# Patient Record
Sex: FEMALE | Race: BLACK OR AFRICAN AMERICAN | Employment: OTHER | URBAN - METROPOLITAN AREA
[De-identification: names, ages, dates, MRNs, and addresses within clinical notes are randomized per-mention and may not be internally consistent; named-entity substitution may affect disease eponyms.]

---

## 2018-06-13 ENCOUNTER — HOSPITAL ENCOUNTER (OUTPATIENT)
Dept: MRI IMAGING | Age: 83
Discharge: HOME | End: 2018-06-13
Attending: FAMILY MEDICINE
Payer: MEDICARE

## 2018-06-13 VITALS — WEIGHT: 96 LBS

## 2018-06-13 DIAGNOSIS — L89.44: ICD-10-CM

## 2018-06-13 PROCEDURE — 72197 MRI PELVIS W/O & W/DYE: CPT

## 2018-06-13 PROCEDURE — A9575 INJ GADOTERATE MEGLUMI 0.1ML: HCPCS | Performed by: RADIOLOGY

## 2018-06-13 PROCEDURE — 74011250636 HC RX REV CODE- 250/636: Performed by: RADIOLOGY

## 2018-06-13 RX ORDER — GADOTERATE MEGLUMINE 376.9 MG/ML
8 INJECTION INTRAVENOUS
Status: COMPLETED | OUTPATIENT
Start: 2018-06-13 | End: 2018-06-13

## 2018-06-13 RX ADMIN — GADOTERATE MEGLUMINE 9 ML: 376.9 INJECTION INTRAVENOUS at 16:38

## 2018-06-15 ENCOUNTER — HOSPITAL ENCOUNTER (OUTPATIENT)
Dept: GENERAL RADIOLOGY | Age: 83
Discharge: HOME | End: 2018-06-15
Payer: MEDICARE

## 2018-06-15 DIAGNOSIS — R13.12 OROPHARYNGEAL DYSPHAGIA: ICD-10-CM

## 2018-06-15 DIAGNOSIS — R13.10 DYSPHAGIA: ICD-10-CM

## 2018-06-15 PROCEDURE — 74230 X-RAY XM SWLNG FUNCJ C+: CPT

## 2018-06-15 PROCEDURE — 92610 EVALUATE SWALLOWING FUNCTION: CPT

## 2018-06-15 PROCEDURE — G8996 SWALLOW CURRENT STATUS: HCPCS

## 2018-06-15 PROCEDURE — G8998 SWALLOW D/C STATUS: HCPCS

## 2018-06-15 PROCEDURE — 92611 MOTION FLUOROSCOPY/SWALLOW: CPT

## 2018-06-15 PROCEDURE — G8997 SWALLOW GOAL STATUS: HCPCS

## 2018-06-15 NOTE — PROGRESS NOTES
[facility]  [address]    Speech Pathology Modified barium swallow Study with cms g codes  Patient: Janis Jiménez (41 y.o. female)  Date: 6/15/2018  Referring Provider:  Jered Jacobs:   Patient's male family member brought her to [facility]. 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 Another large CVA in 2014. He reports that she can talk, but she was nonverbal in this MBS. He reports that she is on purees and thin liquids in LTC at [facility] and RNs and SLP state that she chokes on thins. He thinks that is from a cold. He wants her to be on regular foods b/c Hemanth Payan is starving and doesn't like purees\". He reports that  She has lost 10 lbs in the last 6 months that started with  UTI. He reported that when he takes her home, he fed her chopped chicken, green beans and thin liquids and she did not choke. He feeds her lunch and dinner at the SNF everyday. He reports that he is talking to MD about signing a waiver to feed her more solid foods for comfort. OBJECTIVE:   Past Medical History: ? No past medical history on file. No past surgical history on file. Current Dietary Status:  Dysphagia 1, thins. ?  Radiologist: Shameka/Jamel  Film Views: Lateral  Patient Position: upright in Hausted chair, but head leaning to L and neck hyperextended. unable to reposition due to spasticity    Trial 1: Trial 2:   Consistency Presented: Thin liquid; Solid;Puree     How Presented: SLP-fed/presented;Spoon;Straw      ORAL PHASE:      Bolus Acceptance: Impaired (intermittantly reduced straw suck. reduced oral opening at times). She did mini sucks repetitively at times that was nonproductive. She could not feed herself. Bolus Formation/Control: Impaired (sevre oral holding of cracker with residue in lateral sulcus in small chunks and along palate. SLP had to remove some from L lingual sulcus with finger sweep.  )She did have intermittant bouts of chewing for a few seconds. : Mastication;Delayed;Piecemeal  :     Propulsion:  (slow, sluggish tongue movement). Difficulty scraping material off palate. Liquids washed it off. Oral Residue: Lingual;Palatal;Left . SHE ALSO had material pooling in posterior sublingual sulci, especially on L, which gradually spilled to pharynx, even in solid chunks. PHARYNGEAL PHASE:      Initiation of Swallow:  (mild /mild moderate 4-5 seconds)     Timing: Pooling 1-5 sec (swallow triggered when pyriforms half full with thins and when valleculae half full with purees.  )     Penetration: None     Aspiration/Timing: No evidence of aspiration. She has risks due to delay in swallow and intermittant residue without awareness with thins. Pharyngeal Clearance: Pyriform residue ;10-50% (after multiple sips of thins without awareness. had to cue her to swallow again)     Attempted Modifications: Alternate liquids/solids     Effective Modifications:  (alternating Solids and liquids helped clear oral residue, but this gives her aspiration risk to flush solid chunks into airway)      aspiration risks due to 1) feeder status 2)  Impaired LOC  3)  Hyperextended neck posture that was rigid. 4) dependency for oral care  5) dysphagia. Trial 3: Trial 4:                            :    :                                                                        Decreased Tongue Base Retraction?: No  Laryngeal Elevation: WFL (within functional limits)  Aspiration/Penetration Score: 1 (No penetration or aspiration-Contrast does not enter the airway)                     In compliance with CMSs Claims Based Outcome Reporting, the following G-code set was chosen for this patient based the use of the NOMS functional outcome to quantify this patient's level of swallowing impairment.     Using the NOMS, the patient was determined to be at level 4 for swallow function which correlates with the CK= 40-59% level of severity. Based on the objective assessment provided within this note, the current, goal, and discharge g-codes are as follows:    Swallow  Swallowing:   Swallow Current Status CK= 40-59%   Swallow Goal Status CK= 40-59%   Swallow D/C Status CK= 40-59%      NOMS Swallowing Levels:  Level 1 (CN): NPO  Level 2 (CM): NPO but takes consistency in therapy  Level 3 (CL): Takes less than 50% of nutrition p.o. and continues with nonoral feedings; and/or safe with mod cues; and/or max diet restriction  Level 4 (CK): Safe swallow but needs mod cues; and/or mod diet restriction; and/or still requires some nonoral feeding/supplements  Level 5 (CJ): Safe swallow with min diet restriction; and/or needs min cues  Level 6 (CI): Independent with p.o.; rare cues; usually self cues; may need to avoid some foods or needs extra time  Level 7 ([address] [place] [address]): Independent for all p.o.  KIMMY. (2003). National Outcomes Measurement System (NOMS): Adult Speech-Language Pathology User's Guide. ASSESSMENT :  Based on the objective data described above, the patient presents with moderate / moderate to severe oral dysphagia and mild-moderate pharyngeal dysphagia (delay in swallow). The oral issues provide a significant aspiration risk with reduced lingual movement a large predictor of aspiration. She can intermittently chew, but required max cues and time. We discussed that her PO intake may have decreased after the UTI due to change in taste with meds or thrush. She has serious nutritional issues at this time. PLAN/RECOMMENDATIONS :  Defer to primary SLP and MD.   This patient needs more nutrition, but has moderate aspiration risks as well. Safest recommended diet is dysphagia 1, thins; feeding only when awake, alert and when head posture can be in the ideal 90 or less position.     If patient and family working on comfort feedings for quality of life, Solids should be strictly supervised and risk for aspiration/asphixiation must be understood by family. Could consider alternating solid bites with purees, then thins to help clear solid particles from oral cavity. Oral care after meals to remove any pocketing in lateral sulci and palate to reduce post meal aspiration from oral residue. Please check mouth for thrush. COMMUNICATION/EDUCATION:   The above findings and recommendations were discussed with: family of patient who verbalized understanding.     Thank you for this referral.  Bartolome Robert, SLP  Time Calculation: 30 mins

## 2018-07-14 ENCOUNTER — HOSPITAL ENCOUNTER (OUTPATIENT)
Dept: VASCULAR SURGERY | Age: 83
Discharge: HOME | End: 2018-07-14
Attending: FAMILY MEDICINE
Payer: MEDICARE

## 2018-07-14 ENCOUNTER — HOSPITAL ENCOUNTER (EMERGENCY)
Age: 83
Discharge: HOME | End: 2018-07-14
Attending: EMERGENCY MEDICINE
Payer: MEDICARE

## 2018-07-14 VITALS
DIASTOLIC BLOOD PRESSURE: 106 MMHG | TEMPERATURE: 98 F | RESPIRATION RATE: 16 BRPM | BODY MASS INDEX: 15.43 KG/M2 | HEART RATE: 71 BPM | HEIGHT: 66 IN | SYSTOLIC BLOOD PRESSURE: 206 MMHG | WEIGHT: 96 LBS | OXYGEN SATURATION: 100 %

## 2018-07-14 DIAGNOSIS — E87.6 HYPOKALEMIA: ICD-10-CM

## 2018-07-14 DIAGNOSIS — I82.622 ACUTE DEEP VEIN THROMBOSIS (DVT) OF LEFT UPPER EXTREMITY, UNSPECIFIED VEIN (HCC): Primary | ICD-10-CM

## 2018-07-14 DIAGNOSIS — R60.0 EDEMA OF UPPER EXTREMITY: ICD-10-CM

## 2018-07-14 LAB
ANION GAP SERPL CALC-SCNC: 7 MMOL/L (ref 5–15)
BUN SERPL-MCNC: 5 MG/DL (ref 6–20)
BUN/CREAT SERPL: 9 (ref 12–20)
CALCIUM SERPL-MCNC: 9.9 MG/DL (ref 8.5–10.1)
CHLORIDE SERPL-SCNC: 107 MMOL/L (ref 97–108)
CO2 SERPL-SCNC: 33 MMOL/L (ref 21–32)
CREAT SERPL-MCNC: 0.57 MG/DL (ref 0.55–1.02)
GLUCOSE SERPL-MCNC: 75 MG/DL (ref 65–100)
MAGNESIUM SERPL-MCNC: 1.8 MG/DL (ref 1.6–2.4)
POTASSIUM SERPL-SCNC: 2.7 MMOL/L (ref 3.5–5.1)
SODIUM SERPL-SCNC: 147 MMOL/L (ref 136–145)

## 2018-07-14 PROCEDURE — 36415 COLL VENOUS BLD VENIPUNCTURE: CPT | Performed by: EMERGENCY MEDICINE

## 2018-07-14 PROCEDURE — 83735 ASSAY OF MAGNESIUM: CPT | Performed by: EMERGENCY MEDICINE

## 2018-07-14 PROCEDURE — 99283 EMERGENCY DEPT VISIT LOW MDM: CPT

## 2018-07-14 PROCEDURE — 93971 EXTREMITY STUDY: CPT

## 2018-07-14 PROCEDURE — 74011250637 HC RX REV CODE- 250/637: Performed by: EMERGENCY MEDICINE

## 2018-07-14 PROCEDURE — 80048 BASIC METABOLIC PNL TOTAL CA: CPT | Performed by: EMERGENCY MEDICINE

## 2018-07-14 RX ORDER — POTASSIUM CHLORIDE 1500 MG/1
20 TABLET, FILM COATED, EXTENDED RELEASE ORAL 2 TIMES DAILY
Qty: 40 TAB | Refills: 0 | Status: SHIPPED | OUTPATIENT
Start: 2018-07-14

## 2018-07-14 RX ORDER — POTASSIUM CHLORIDE 1.5 G/1.77G
40 POWDER, FOR SOLUTION ORAL
Status: COMPLETED | OUTPATIENT
Start: 2018-07-14 | End: 2018-07-14

## 2018-07-14 RX ORDER — POTASSIUM CHLORIDE 750 MG/1
40 TABLET, FILM COATED, EXTENDED RELEASE ORAL
Status: DISCONTINUED | OUTPATIENT
Start: 2018-07-14 | End: 2018-07-14

## 2018-07-14 RX ADMIN — APIXABAN 10 MG: 5 TABLET, FILM COATED ORAL at 14:43

## 2018-07-14 RX ADMIN — POTASSIUM CHLORIDE 40 MEQ: 1.5 POWDER, FOR SOLUTION ORAL at 14:43

## 2018-07-14 NOTE — DISCHARGE INSTRUCTIONS
Please have potassium recheck in 2 days    Pt has a peripheral IV in place for the rest of the weekend. Please ask her doctor to order a PICC line to be placed as an outpatient this coming week. Deep Vein Thrombosis: After Your Visit to the Emergency Room  Your Care Instructions  A deep vein thrombosis (DVT) is a blood clot in certain veins of the legs, pelvis, or arms. The main goal of treatment is to prevent the blood clot from growing or moving to the lungs. A blood clot in a lung can be life-threatening. The doctor may have given you a blood thinner (anticoagulant). A blood thinner can stop the blood clot from growing larger and prevent new clots from forming. You will need to take a blood thinner for 3 to 6 months or longer. Even though you have been released from the emergency room, you still need to watch for any problems. The doctor carefully checked you. But sometimes problems can develop later. If you have new symptoms, or if your symptoms do not get better, return to the emergency room or call your doctor right away. If you have sudden chest pain and shortness of breath, or you cough up blood, call 911 or seek other emergency help right away. A visit to the emergency room is only one step in your treatment. Even if you feel better, you still need to do what your doctor recommends, such as going to all suggested follow-up appointments and taking medicines exactly as directed. This will help you recover and help prevent future problems. How can you care for yourself at home? · Take your medicines exactly as prescribed. Call your doctor if you think you are having a problem with your medicine. · Follow your doctor's instructions about having blood tests. You may need blood tests--maybe every day at first--to see how well the blood thinner is working. · Wear compression stockings if your doctor prescribes them. These stockings are tighter at the feet than on the legs.  They can reduce pain and swelling and may keep blood from pooling in your legs. You can get them with a prescription at a medical supply store or at some drugstores. · When you sit, use a pillow to raise the arm or leg that has the blood clot. Try to keep it above the level of your heart. · Use a heating pad over the area for 20 to 30 minutes, 3 or 4 times a day, to reduce pain and swelling. · Wear medical alert jewelry that shows that you are on a blood-thinning medicine. You can buy this at most drugstores. When should you call for help? Call 911 if:  · You passed out (lost consciousness). · You have sudden chest pain and shortness of breath, or you cough up blood. · You vomit blood or what looks like coffee grounds. · You pass maroon or very bloody stools. Return to the emergency room now if:  · Your leg or thigh pain is getting worse. · You have signs of another blood clot, such as:  ¨ New pain in your calf, back of the knee, thigh, or groin. ¨ New redness and swelling in your leg or groin. Call your doctor today if:  · You have new bruises or blood spots under your skin. · You have a nosebleed. · Your stools are black and tarlike or have streaks of blood. · You have blood in your urine. · Your gums bleed when you brush your teeth. Where can you learn more? Go to [employer]  Enter R888 in the search box to learn more about \"Deep Vein Thrombosis: After Your Visit to the Emergency Room. \"   © 8837-4992 Healthwise, Incorporated. Care instructions adapted under license by [employer] (which disclaims liability or warranty for this information). This care instruction is for use with your licensed healthcare professional. If you have questions about a medical condition or this instruction, always ask your healthcare professional. [place] [address] any warranty or liability for your use of this information. Content Version: 9.4.71445;  Last Revised: September 30, 2010 Hypokalemia: Care Instructions  Your Care Instructions    Hypokalemia (say \"nv-ux-ftp-FLAKO-makayla-uh\") is a low level of potassium. The heart, muscles, kidneys, and nervous system all need potassium to work well. This problem has many different causes. Kidney problems, diet, and medicines like diuretics and laxatives can cause it. So can vomiting or diarrhea. In some cases, cancer is the cause. Your doctor may do tests to find the cause of your low potassium levels. You may need medicines to bring your potassium levels back to normal. You may also need regular blood tests to check your potassium. If you have very low potassium, you may need intravenous (IV) medicines. You also may need tests to check the electrical activity of your heart. Heart problems caused by low potassium levels can be very serious. Follow-up care is a key part of your treatment and safety. Be sure to make and go to all appointments, and call your doctor if you are having problems. It's also a good idea to know your test results and keep a list of the medicines you take. How can you care for yourself at home? · If your doctor recommends it, eat foods that have a lot of potassium. These include fresh fruits, juices, and vegetables. They also include nuts, beans, and milk. · Be safe with medicines. If your doctor prescribes medicines or potassium supplements, take them exactly as directed. Call your doctor if you have any problems with your medicines. · Get your potassium levels tested as often as your doctor tells you. When should you call for help? Call 911 anytime you think you may need emergency care. For example, call if:    · You feel like your heart is missing beats.  Heart problems caused by low potassium can cause death.     · You passed out (lost consciousness).     · You have a seizure.    Call your doctor now or seek immediate medical care if:    · You feel weak or unusually tired.     · You have severe arm or leg cramps.     · You have tingling or numbness.     · You feel sick to your stomach, or you vomit.     · You have belly cramps.     · You feel bloated or constipated.     · You have to urinate a lot.     · You feel very thirsty most of the time.     · You are dizzy or lightheaded, or you feel like you may faint.     · You feel depressed, or you lose touch with reality.    Watch closely for changes in your health, and be sure to contact your doctor if:    · You do not get better as expected. Where can you learn more? Go to http://gerri-christi.info/. Enter G358 in the search box to learn more about \"Hypokalemia: Care Instructions. \"  Current as of: May 12, 2017  Content Version: 11.7  © 6209-6560 [employer]. Care instructions adapted under license by [employer] (which disclaims liability or warranty for this information). If you have questions about a medical condition or this instruction, always ask your healthcare professional. [address] any warranty or liability for your use of this information.

## 2018-07-14 NOTE — PROCEDURES
[address]  *** FINAL REPORT ***    Name: Ronak Pereira  MRN: [id number]    Outpatient  : 1935  HIS Order #: [id number]  [id number] [facility] Visit #: [id number]  Date: 2018    TYPE OF TEST: Peripheral Venous Testing    REASON FOR TEST  Pain in limb, Limb swelling    Left Arm:-  Deep venous thrombosis:           Yes  Proximal extent of thrombus:      Subclavian  Superficial venous thrombosis:    No      INTERPRETATION/FINDINGS  PROCEDURE: LEFT UPPER EXTREMITY VENOUS DUPLEX: Evaluation of upper  extremity veins with ultrasound (B-mode imaging, pulsed Doppler, color   Doppler). Includes the internal jugular, subclavian, axillary,  brachial, radial, ulnar, basilic, and cephalic veins. FINDINGS: Anya  scale and color flow duplex images of the veins of the  left upper extremity and bilateral subclavian veins demonstrate  non-compressibility, with absence of flow in the left subclavian,  axillary, and brachial vein, consistent with thrombus formation. CONCLUSION:  Acute mostly occlusive deep vein thrombus identified in  the left mid to distal subclavian vein, axillary vein and brachial  vein. No evidence of thrombus in contralateral right subclavian vein. Unable to evaluate the basilic and cephalic vein in the left upper arm   due to small calliber vessels, however no thrombus is noted in the  left forearm. ADDITIONAL COMMENTS    I have personally reviewed the data relevant to the interpretation of  this  study.     TECHNOLOGIST: MERLE Sheridan  Signed: 2018 12:21 PM    PHYSICIAN: Kwabena Snowden MD  Signed: 07/15/2018 06:31 AM

## 2018-07-14 NOTE — ED PROVIDER NOTES
HPI Comments: 81 y.o. female with past medical history significant for TIAs and a CVA who presents from the [id number] [place] [address] of [facility] via a private vehicle accompanied by her  for referral from vascular. Per chart review, the vascular study showed a left upper extremity DVT up to the subclavian vein. Furthermore, pt has had TIAs and a major CVA in 2014. Pt's  reports the test was ordered by Dr. Yu Burch from the [address] [place] [address].  reports the pt was discharged from a recent hospitalization at [facility] approximately two weeks ago.  reports the pt was hospitalized for 4 days but he cannot recall what the admission diagnosis was for the pt. Pt's  reports the pt has had a PICC line for IV antibiotics since her admission. Per review of the [address] records for the pt, she is on Vancomycin with an end date for August 3, 2018. Furthermore, pt also receives 2 grams of Cefepime in the evening which is indicated for osteomyelitis. Pt is seen at [address] [place] [address]. Pt's  denies the pt has any history of cancer. Pt's  states the pt has not had any hemoptysis, shortness of breath, or fevers. Nonsmoker      Old chart reviewed - had DVT scan. It shows subclavian DVT. Full history, physical exam, and ROS unable to be obtained due to:  Difficulty speaking due to previous CVA. There are no other acute medical concerns at this time. PCP: Barb Stahl MD    Note written by Willa Gilford, Scribe, as dictated by Jam Silva, DO 12:51 PM.        The history is provided by the nursing home and the spouse. The history is limited by the condition of the patient. No  was used. No past medical history on file. No past surgical history on file. No family history on file.     Social History     Social History    Marital status:      Spouse name: N/A    Number of children: N/A    Years of education: N/A     Occupational History    Not on file. Social History Main Topics    Smoking status: Not on file    Smokeless tobacco: Not on file    Alcohol use Not on file    Drug use: Not on file    Sexual activity: Not on file     Other Topics Concern    Not on file     Social History Narrative         ALLERGIES: Ciprofloxacin and Morphine    Review of Systems   Unable to perform ROS: Other (Difficulty speaking due to previous CVA.)       Vitals:    07/14/18 1221   BP: (!) 206/106   Pulse: 71   Resp: 16   Temp: 98 °F (36.7 °C)   SpO2: 100%   Weight: 43.5 kg (96 lb)   Height: 5' 6\" (1.676 m)            Physical Exam   HENT:   Head: Normocephalic and atraumatic. Nose: Nose normal.   Mouth/Throat: Oropharynx is clear and moist. No oropharyngeal exudate. Eyes: Conjunctivae and EOM are normal. Pupils are equal, round, and reactive to light. Right eye exhibits no discharge. Left eye exhibits no discharge. No scleral icterus. Neck: Neck supple. No tracheal deviation present. Cardiovascular: Normal rate, regular rhythm, normal heart sounds and intact distal pulses. Exam reveals no gallop and no friction rub. No murmur heard. Pulmonary/Chest: Effort normal and breath sounds normal. She has no wheezes. She has no rales. Abdominal: Soft. She exhibits no distension and no mass. There is no tenderness. There is no rebound and no guarding. Musculoskeletal: Normal range of motion. She exhibits edema. She exhibits no tenderness. Left upper extremity a little more swollen than the right. Good pulse distally. Skin: Skin is warm and dry. Nursing note and vitals reviewed. [facility]      ED Course       Procedures  PROGRESS NOTE:  1:27 PM  Spoke to Lina Roe NP for Dr. Nathan Cates and Dr. Joleen Menendez. The plan is to remove the PICC line, placement of a peripheral IV and have a new PICC line placed as an outpatient this week. BP noted. Continue monitoring at [facility].   Looks like asymptomatic htn.     2:16 PM  Patient's results have been reviewed with them. Patient and/or family have verbally conveyed their understanding and agreement of the patient's signs, symptoms, diagnosis, treatment and prognosis and additionally agree to follow up as recommended or return to the Emergency Room should their condition change prior to follow-up. Discharge instructions have also been provided to the patient with some educational information regarding their diagnosis as well a list of reasons why they would want to return to the ER prior to their follow-up appointment should their condition change.           Labs Reviewed   METABOLIC PANEL, BASIC - Abnormal; Notable for the following:        Result Value    Sodium 147 (*)     Potassium 2.7 (*)     CO2 33 (*)     BUN 5 (*)     BUN/Creatinine ratio 9 (*)     All other components within normal limits   SAMPLES BEING HELD   MAGNESIUM